# Patient Record
Sex: MALE | Race: WHITE | Employment: FULL TIME | URBAN - METROPOLITAN AREA
[De-identification: names, ages, dates, MRNs, and addresses within clinical notes are randomized per-mention and may not be internally consistent; named-entity substitution may affect disease eponyms.]

---

## 2018-09-12 ENCOUNTER — HOSPITAL ENCOUNTER (OUTPATIENT)
Dept: ULTRASOUND IMAGING | Age: 58
Discharge: HOME | End: 2018-09-12
Payer: COMMERCIAL

## 2018-09-12 DIAGNOSIS — R79.89 LIVER FUNCTION TEST ABNORMALITY: ICD-10-CM

## 2018-09-12 PROCEDURE — 76700 US EXAM ABDOM COMPLETE: CPT

## 2025-02-03 ENCOUNTER — OFFICE VISIT (OUTPATIENT)
Age: 65
End: 2025-02-03
Payer: COMMERCIAL

## 2025-02-03 VITALS
WEIGHT: 185 LBS | DIASTOLIC BLOOD PRESSURE: 80 MMHG | HEART RATE: 86 BPM | BODY MASS INDEX: 28.04 KG/M2 | SYSTOLIC BLOOD PRESSURE: 116 MMHG | OXYGEN SATURATION: 98 % | HEIGHT: 68 IN

## 2025-02-03 DIAGNOSIS — K21.9 GASTROESOPHAGEAL REFLUX DISEASE WITHOUT ESOPHAGITIS: ICD-10-CM

## 2025-02-03 DIAGNOSIS — E78.2 MIXED HYPERLIPIDEMIA: ICD-10-CM

## 2025-02-03 DIAGNOSIS — Z87.891 HISTORY OF TOBACCO USE: ICD-10-CM

## 2025-02-03 DIAGNOSIS — I10 BENIGN ESSENTIAL HTN: ICD-10-CM

## 2025-02-03 DIAGNOSIS — R07.89 NON-CARDIAC CHEST PAIN: Primary | ICD-10-CM

## 2025-02-03 PROCEDURE — 99203 OFFICE O/P NEW LOW 30 MIN: CPT | Performed by: INTERNAL MEDICINE

## 2025-02-03 PROCEDURE — 3079F DIAST BP 80-89 MM HG: CPT | Performed by: INTERNAL MEDICINE

## 2025-02-03 PROCEDURE — 3074F SYST BP LT 130 MM HG: CPT | Performed by: INTERNAL MEDICINE

## 2025-02-03 RX ORDER — ATORVASTATIN CALCIUM 20 MG
TABLET ORAL
COMMUNITY

## 2025-02-03 RX ORDER — AMLODIPINE AND BENAZEPRIL HYDROCHLORIDE 10; 20 MG/1; MG/1
CAPSULE ORAL DAILY
COMMUNITY
Start: 2024-12-02

## 2025-02-03 RX ORDER — LISDEXAMFETAMINE DIMESYLATE 40 MG/1
CAPSULE ORAL
COMMUNITY

## 2025-02-03 RX ORDER — ACYCLOVIR 800 MG/1
800 TABLET ORAL DAILY
COMMUNITY
Start: 2024-12-04

## 2025-02-03 RX ORDER — PANTOPRAZOLE SODIUM 40 MG/1
60 TABLET, DELAYED RELEASE ORAL
COMMUNITY
Start: 2024-12-10

## 2025-02-03 RX ORDER — METHOCARBAMOL 750 MG/1
TABLET, FILM COATED ORAL
COMMUNITY
Start: 2025-01-14

## 2025-02-03 ASSESSMENT — PATIENT HEALTH QUESTIONNAIRE - PHQ9
2. FEELING DOWN, DEPRESSED OR HOPELESS: NOT AT ALL
1. LITTLE INTEREST OR PLEASURE IN DOING THINGS: NOT AT ALL
SUM OF ALL RESPONSES TO PHQ QUESTIONS 1-9: 0
SUM OF ALL RESPONSES TO PHQ9 QUESTIONS 1 & 2: 0
SUM OF ALL RESPONSES TO PHQ QUESTIONS 1-9: 0

## 2025-02-03 NOTE — PROGRESS NOTES
mucous membranes  Neck - supple, no JVD  Chest - clear to auscultation, no wheezes, rales or rhonchi  Heart - normal rate, regular rhythm, normal S1, S2, no murmurs, rubs, clicks or gallops  Abdomen - soft, nontender, nondistended, no masses or organomegaly  Extremities - peripheral pulses normal, no pedal edema      Recent Labs:  No results found for: \"CHOL\", \"CHLST\", \"CHOLV\", \"115150\", \"HDL\", \"HDLC\", \"LDL\", \"LDLC\"  No results found for: \"BONI\", \"CREAPOC\"  No results found for: \"BUN\", \"BUNPOC\", \"IBUN\"  No results found for: \"K\", \"PLK\", \"WBK\"  No results found for: \"HBA1C\"  No results found for: \"HGBPOC\", \"HGB\", \"HGBP\"  No results found for: \"PLT\"    Reviewed:  No past medical history on file.  Social History     Tobacco Use   Smoking Status Never   Smokeless Tobacco Never     Social History     Substance and Sexual Activity   Alcohol Use Not Currently     No Known Allergies    Current Outpatient Medications   Medication Sig    pantoprazole (PROTONIX) 40 MG tablet 60 tablets    acyclovir (ZOVIRAX) 800 MG tablet Take 1 tablet by mouth daily    amLODIPine-benazepril (LOTREL) 10-20 MG per capsule Take by mouth daily    LIPITOR 20 MG tablet     VYVANSE 40 MG CAPS     methocarbamol (ROBAXIN) 750 MG tablet TAKE 1 TO 2 TABLETS BY MOUTH UP TO THREE TIMES DAILY WITH MEALS AS NEEDED FOR SPASM     No current facility-administered medications for this visit.       Hosea Hayden MD  [facility]  [address], [address]  [place] [address]